# Patient Record
Sex: FEMALE | Race: BLACK OR AFRICAN AMERICAN | NOT HISPANIC OR LATINO | URBAN - METROPOLITAN AREA
[De-identification: names, ages, dates, MRNs, and addresses within clinical notes are randomized per-mention and may not be internally consistent; named-entity substitution may affect disease eponyms.]

---

## 2018-03-15 PROBLEM — Z00.00 ENCOUNTER FOR PREVENTIVE HEALTH EXAMINATION: Status: ACTIVE | Noted: 2018-03-15

## 2018-03-29 ENCOUNTER — OUTPATIENT (OUTPATIENT)
Dept: OUTPATIENT SERVICES | Facility: HOSPITAL | Age: 23
LOS: 1 days | End: 2018-03-29
Payer: MEDICAID

## 2018-03-29 ENCOUNTER — LABORATORY RESULT (OUTPATIENT)
Age: 23
End: 2018-03-29

## 2018-03-29 ENCOUNTER — APPOINTMENT (OUTPATIENT)
Dept: OBGYN | Facility: CLINIC | Age: 23
End: 2018-03-29
Payer: MEDICAID

## 2018-03-29 VITALS — WEIGHT: 203 LBS | SYSTOLIC BLOOD PRESSURE: 120 MMHG | DIASTOLIC BLOOD PRESSURE: 80 MMHG

## 2018-03-29 DIAGNOSIS — N76.0 ACUTE VAGINITIS: ICD-10-CM

## 2018-03-29 DIAGNOSIS — Z83.3 FAMILY HISTORY OF DIABETES MELLITUS: ICD-10-CM

## 2018-03-29 PROCEDURE — 99213 OFFICE O/P EST LOW 20 MIN: CPT | Mod: GE

## 2018-03-29 PROCEDURE — G0463: CPT

## 2018-03-30 LAB
C TRACH RRNA SPEC QL NAA+PROBE: SIGNIFICANT CHANGE UP
N GONORRHOEA RRNA SPEC QL NAA+PROBE: SIGNIFICANT CHANGE UP
SPECIMEN SOURCE: SIGNIFICANT CHANGE UP

## 2018-04-03 DIAGNOSIS — Z01.419 ENCOUNTER FOR GYNECOLOGICAL EXAMINATION (GENERAL) (ROUTINE) WITHOUT ABNORMAL FINDINGS: ICD-10-CM

## 2019-03-20 ENCOUNTER — APPOINTMENT (OUTPATIENT)
Dept: OBGYN | Facility: CLINIC | Age: 24
End: 2019-03-20

## 2019-05-22 ENCOUNTER — APPOINTMENT (OUTPATIENT)
Dept: OBGYN | Facility: CLINIC | Age: 24
End: 2019-05-22
Payer: COMMERCIAL

## 2019-05-22 ENCOUNTER — LABORATORY RESULT (OUTPATIENT)
Age: 24
End: 2019-05-22

## 2019-05-22 ENCOUNTER — OUTPATIENT (OUTPATIENT)
Dept: OUTPATIENT SERVICES | Facility: HOSPITAL | Age: 24
LOS: 1 days | End: 2019-05-22
Payer: COMMERCIAL

## 2019-05-22 VITALS
SYSTOLIC BLOOD PRESSURE: 124 MMHG | WEIGHT: 223 LBS | HEIGHT: 69 IN | DIASTOLIC BLOOD PRESSURE: 80 MMHG | BODY MASS INDEX: 33.03 KG/M2

## 2019-05-22 DIAGNOSIS — N76.0 ACUTE VAGINITIS: ICD-10-CM

## 2019-05-22 DIAGNOSIS — Z01.411 ENCOUNTER FOR GYNECOLOGICAL EXAMINATION (GENERAL) (ROUTINE) WITH ABNORMAL FINDINGS: ICD-10-CM

## 2019-05-22 PROCEDURE — 87491 CHLMYD TRACH DNA AMP PROBE: CPT

## 2019-05-22 PROCEDURE — 86780 TREPONEMA PALLIDUM: CPT

## 2019-05-22 PROCEDURE — 99395 PREV VISIT EST AGE 18-39: CPT | Mod: GC

## 2019-05-22 PROCEDURE — 87340 HEPATITIS B SURFACE AG IA: CPT

## 2019-05-22 PROCEDURE — 87389 HIV-1 AG W/HIV-1&-2 AB AG IA: CPT

## 2019-05-22 PROCEDURE — 87591 N.GONORRHOEAE DNA AMP PROB: CPT

## 2019-05-22 PROCEDURE — 36415 COLL VENOUS BLD VENIPUNCTURE: CPT

## 2019-05-22 PROCEDURE — G0463: CPT

## 2019-05-22 NOTE — HISTORY OF PRESENT ILLNESS
[___ Month(s) Ago] : [unfilled] month(s) ago [Good] : being in good health [Reproductive Age] : is of reproductive age [Last Screen ___] : last STD screen was [unfilled] [Sexually Active] : is sexually active [Monogamous] : is monogamous [Female ___] : [unfilled] female [No] : no

## 2019-05-22 NOTE — COUNSELING
[Breast Self Exam] : breast self exam [Nutrition] : nutrition [STD (testing, results, tx)] : STD (testing, results, tx)

## 2019-05-23 LAB
C TRACH RRNA SPEC QL NAA+PROBE: SIGNIFICANT CHANGE UP
HBV SURFACE AG SER-ACNC: SIGNIFICANT CHANGE UP
HIV 1+2 AB+HIV1 P24 AG SERPL QL IA: SIGNIFICANT CHANGE UP
N GONORRHOEA RRNA SPEC QL NAA+PROBE: SIGNIFICANT CHANGE UP
SPECIMEN SOURCE: SIGNIFICANT CHANGE UP
T PALLIDUM AB TITR SER: NEGATIVE — SIGNIFICANT CHANGE UP

## 2019-05-23 NOTE — PHYSICAL EXAM
[Awake] : awake [Alert] : alert [Soft] : soft [Oriented x3] : oriented to person, place, and time [Normal] : uterus [No Bleeding] : there was no active vaginal bleeding [Uterine Adnexae] : were not tender and not enlarged [Acute Distress] : no acute distress [Mass] : no breast mass [Nipple Discharge] : no nipple discharge [Axillary LAD] : no axillary lymphadenopathy [Tender] : non tender [FreeTextEntry6] : normal exam

## 2019-05-28 LAB — CYTOLOGY SPEC DOC CYTO: SIGNIFICANT CHANGE UP

## 2019-05-29 DIAGNOSIS — Z31.69 ENCOUNTER FOR OTHER GENERAL COUNSELING AND ADVICE ON PROCREATION: ICD-10-CM

## 2019-05-29 DIAGNOSIS — Z01.411 ENCOUNTER FOR GYNECOLOGICAL EXAMINATION (GENERAL) (ROUTINE) WITH ABNORMAL FINDINGS: ICD-10-CM

## 2019-06-03 ENCOUNTER — LABORATORY RESULT (OUTPATIENT)
Age: 24
End: 2019-06-03

## 2019-06-04 ENCOUNTER — OUTPATIENT (OUTPATIENT)
Dept: OUTPATIENT SERVICES | Facility: HOSPITAL | Age: 24
LOS: 1 days | End: 2019-06-04
Payer: COMMERCIAL

## 2019-06-04 ENCOUNTER — LABORATORY RESULT (OUTPATIENT)
Age: 24
End: 2019-06-04

## 2019-06-04 ENCOUNTER — APPOINTMENT (OUTPATIENT)
Dept: OBGYN | Facility: CLINIC | Age: 24
End: 2019-06-04
Payer: COMMERCIAL

## 2019-06-04 ENCOUNTER — MESSAGE (OUTPATIENT)
Age: 24
End: 2019-06-04

## 2019-06-04 DIAGNOSIS — Z31.69 ENCOUNTER FOR OTHER GENERAL COUNSELING AND ADVICE ON PROCREATION: ICD-10-CM

## 2019-06-04 DIAGNOSIS — Z78.9 OTHER SPECIFIED HEALTH STATUS: ICD-10-CM

## 2019-06-04 DIAGNOSIS — N76.0 ACUTE VAGINITIS: ICD-10-CM

## 2019-06-04 DIAGNOSIS — E22.9 HYPERFUNCTION OF PITUITARY GLAND, UNSPECIFIED: ICD-10-CM

## 2019-06-04 PROCEDURE — 82166 ASSAY ANTI-MULLERIAN HORM: CPT

## 2019-06-04 PROCEDURE — 86765 RUBEOLA ANTIBODY: CPT

## 2019-06-04 PROCEDURE — 86803 HEPATITIS C AB TEST: CPT

## 2019-06-04 PROCEDURE — 84146 ASSAY OF PROLACTIN: CPT

## 2019-06-04 PROCEDURE — 84439 ASSAY OF FREE THYROXINE: CPT

## 2019-06-04 PROCEDURE — 86762 RUBELLA ANTIBODY: CPT

## 2019-06-04 PROCEDURE — 86787 VARICELLA-ZOSTER ANTIBODY: CPT

## 2019-06-04 PROCEDURE — 36415 COLL VENOUS BLD VENIPUNCTURE: CPT

## 2019-06-04 PROCEDURE — 86900 BLOOD TYPING SEROLOGIC ABO: CPT

## 2019-06-04 PROCEDURE — 83001 ASSAY OF GONADOTROPIN (FSH): CPT

## 2019-06-04 PROCEDURE — 84443 ASSAY THYROID STIM HORMONE: CPT

## 2019-06-04 PROCEDURE — 99213 OFFICE O/P EST LOW 20 MIN: CPT | Mod: GC

## 2019-06-04 PROCEDURE — 87340 HEPATITIS B SURFACE AG IA: CPT

## 2019-06-04 PROCEDURE — G0463: CPT

## 2019-06-04 PROCEDURE — 86850 RBC ANTIBODY SCREEN: CPT

## 2019-06-04 PROCEDURE — 82670 ASSAY OF TOTAL ESTRADIOL: CPT

## 2019-06-04 PROCEDURE — 87389 HIV-1 AG W/HIV-1&-2 AB AG IA: CPT

## 2019-06-05 LAB
ESTRADIOL FREE SERPL-MCNC: 23 PG/ML — SIGNIFICANT CHANGE UP
FSH SERPL-MCNC: 5.9 IU/L — SIGNIFICANT CHANGE UP
HBV SURFACE AG SER-ACNC: SIGNIFICANT CHANGE UP
HCV AB S/CO SERPL IA: 0.09 S/CO — SIGNIFICANT CHANGE UP (ref 0–0.99)
HCV AB SERPL-IMP: SIGNIFICANT CHANGE UP
HIV 1+2 AB+HIV1 P24 AG SERPL QL IA: SIGNIFICANT CHANGE UP
MEV IGG SER-ACNC: 132 AU/ML — SIGNIFICANT CHANGE UP
MEV IGG+IGM SER-IMP: POSITIVE — SIGNIFICANT CHANGE UP
PROLACTIN SERPL-MCNC: 35.2 NG/ML — HIGH (ref 3.4–24.1)
RUBV IGG SER-ACNC: 3.3 INDEX — SIGNIFICANT CHANGE UP
RUBV IGG SER-IMP: POSITIVE — SIGNIFICANT CHANGE UP
T4 FREE SERPL-MCNC: 1 NG/DL — SIGNIFICANT CHANGE UP (ref 0.9–1.8)
T4 FREE+ TSH PNL SERPL: 2.17 UIU/ML — SIGNIFICANT CHANGE UP (ref 0.27–4.2)
VZV IGG FLD QL IA: 85.1 INDEX — SIGNIFICANT CHANGE UP
VZV IGG FLD QL IA: NEGATIVE — SIGNIFICANT CHANGE UP

## 2019-06-06 LAB — MEV IGM SER-ACNC: NEGATIVE — SIGNIFICANT CHANGE UP

## 2019-06-06 RX ORDER — PRENATAL VIT NO.130/IRON/FOLIC 27MG-0.8MG
27-0.8 TABLET ORAL DAILY
Qty: 30 | Refills: 2 | Status: ACTIVE | COMMUNITY
Start: 2019-06-04 | End: 1900-01-01

## 2019-06-06 NOTE — END OF VISIT
[FreeTextEntry3] : I saw and counseled patient with Dr. Kim. Briefly, patient is a 24/yo desiring donor inseminations. Advised that we could not sign a form from donor sperm [employer] for her to use at home. Advised to make an appt at [facility]. Obtained basic infertility workup. \par \par Vero Spivey MD\par TAYLOR Attending Physician [>50% of Time Spent on Counseling and Coordination of Care for  ___] : Greater than 50% of the encounter time was spent on counseling and coordination of care for [unfilled] [Time Spent: ___ minutes] : I have spent [unfilled] minutes of face to face time with the patient

## 2019-06-07 ENCOUNTER — OTHER (OUTPATIENT)
Age: 24
End: 2019-06-07

## 2019-06-07 DIAGNOSIS — E22.9 HYPERFUNCTION OF PITUITARY GLAND, UNSPECIFIED: ICD-10-CM

## 2019-06-08 LAB
ANTI-MULLERIAN HORMONE: 4.57 NG/ML — SIGNIFICANT CHANGE UP
ANTI-MULLERIAN HORMONE: 4.73 NG/ML — SIGNIFICANT CHANGE UP

## 2023-02-25 ENCOUNTER — APPOINTMENT (OUTPATIENT)
Dept: INTERNAL MEDICINE | Facility: CLINIC | Age: 28
End: 2023-02-25

## 2024-12-28 NOTE — END OF VISIT
Last round until 0600. Patient has order for DND [id number]. Patient instructed to call if she needs anything. Mother at bedside.   [] : Resident